# Patient Record
Sex: MALE | URBAN - NONMETROPOLITAN AREA
[De-identification: names, ages, dates, MRNs, and addresses within clinical notes are randomized per-mention and may not be internally consistent; named-entity substitution may affect disease eponyms.]

---

## 2021-01-27 ENCOUNTER — DOCTOR'S OFFICE (OUTPATIENT)
Dept: URBAN - NONMETROPOLITAN AREA CLINIC 1 | Facility: CLINIC | Age: 36
Setting detail: OPHTHALMOLOGY
End: 2021-01-27
Payer: COMMERCIAL

## 2021-01-27 DIAGNOSIS — H33.052: ICD-10-CM

## 2021-01-27 PROCEDURE — 76512 OPH US DX B-SCAN: CPT | Performed by: OPHTHALMOLOGY

## 2021-01-28 PROBLEM — H33.052 RETINAL DETACHMENT; LEFT EYE: Status: ACTIVE | Noted: 2021-01-27

## 2021-01-29 ASSESSMENT — VISUAL ACUITY
OS_BCVA: 20/40-2
OD_BCVA: LP

## 2023-11-14 ENCOUNTER — HOSPITAL ENCOUNTER (EMERGENCY)
Facility: HOSPITAL | Age: 38
Discharge: HOME | End: 2023-11-14
Attending: EMERGENCY MEDICINE | Admitting: EMERGENCY MEDICINE
Payer: COMMERCIAL

## 2023-11-14 VITALS
RESPIRATION RATE: 18 BRPM | BODY MASS INDEX: 44.14 KG/M2 | HEART RATE: 68 BPM | WEIGHT: 298 LBS | OXYGEN SATURATION: 97 % | SYSTOLIC BLOOD PRESSURE: 124 MMHG | DIASTOLIC BLOOD PRESSURE: 69 MMHG | HEIGHT: 69 IN | TEMPERATURE: 98.2 F

## 2023-11-14 DIAGNOSIS — R10.9 FLANK PAIN: Primary | ICD-10-CM

## 2023-11-14 LAB
ALBUMIN SERPL BCP-MCNC: 4.1 G/DL (ref 3.5–5)
ALP SERPL-CCNC: 67 U/L (ref 34–104)
ALT SERPL W P-5'-P-CCNC: 62 U/L (ref 7–52)
ANION GAP SERPL CALCULATED.3IONS-SCNC: 9 MMOL/L
APTT PPP: 29 SECONDS (ref 23–37)
AST SERPL W P-5'-P-CCNC: 85 U/L (ref 13–39)
ATRIAL RATE: 78 BPM
BACTERIA UR QL AUTO: ABNORMAL /HPF
BASOPHILS # BLD AUTO: 0.02 THOUSANDS/ÂΜL (ref 0–0.1)
BASOPHILS NFR BLD AUTO: 0 % (ref 0–1)
BILIRUB SERPL-MCNC: 0.41 MG/DL (ref 0.2–1)
BILIRUB UR QL STRIP: NEGATIVE
BUN SERPL-MCNC: 17 MG/DL (ref 5–25)
CALCIUM SERPL-MCNC: 9.3 MG/DL (ref 8.4–10.2)
CHLORIDE SERPL-SCNC: 108 MMOL/L (ref 96–108)
CLARITY UR: CLEAR
CO2 SERPL-SCNC: 26 MMOL/L (ref 21–32)
COLOR UR: YELLOW
CREAT SERPL-MCNC: 0.93 MG/DL (ref 0.6–1.3)
EOSINOPHIL # BLD AUTO: 0.21 THOUSAND/ÂΜL (ref 0–0.61)
EOSINOPHIL NFR BLD AUTO: 3 % (ref 0–6)
ERYTHROCYTE [DISTWIDTH] IN BLOOD BY AUTOMATED COUNT: 13 % (ref 11.6–15.1)
GFR SERPL CREATININE-BSD FRML MDRD: 104 ML/MIN/1.73SQ M
GLUCOSE SERPL-MCNC: 104 MG/DL (ref 65–140)
GLUCOSE UR STRIP-MCNC: NEGATIVE MG/DL
HCT VFR BLD AUTO: 41.2 % (ref 36.5–49.3)
HGB BLD-MCNC: 13.8 G/DL (ref 12–17)
HGB UR QL STRIP.AUTO: NEGATIVE
IMM GRANULOCYTES # BLD AUTO: 0.01 THOUSAND/UL (ref 0–0.2)
IMM GRANULOCYTES NFR BLD AUTO: 0 % (ref 0–2)
INR PPP: 0.99 (ref 0.84–1.19)
KETONES UR STRIP-MCNC: ABNORMAL MG/DL
LEUKOCYTE ESTERASE UR QL STRIP: NEGATIVE
LYMPHOCYTES # BLD AUTO: 2.36 THOUSANDS/ÂΜL (ref 0.6–4.47)
LYMPHOCYTES NFR BLD AUTO: 33 % (ref 14–44)
MCH RBC QN AUTO: 28 PG (ref 26.8–34.3)
MCHC RBC AUTO-ENTMCNC: 33.5 G/DL (ref 31.4–37.4)
MCV RBC AUTO: 84 FL (ref 82–98)
MONOCYTES # BLD AUTO: 0.61 THOUSAND/ÂΜL (ref 0.17–1.22)
MONOCYTES NFR BLD AUTO: 8 % (ref 4–12)
MUCOUS THREADS UR QL AUTO: ABNORMAL
NEUTROPHILS # BLD AUTO: 4.05 THOUSANDS/ÂΜL (ref 1.85–7.62)
NEUTS SEG NFR BLD AUTO: 56 % (ref 43–75)
NITRITE UR QL STRIP: NEGATIVE
NON-SQ EPI CELLS URNS QL MICRO: ABNORMAL /HPF
NRBC BLD AUTO-RTO: 0 /100 WBCS
P AXIS: 56 DEGREES
PH UR STRIP.AUTO: 6.5 [PH] (ref 4.5–8)
PLATELET # BLD AUTO: 280 THOUSANDS/UL (ref 149–390)
PMV BLD AUTO: 10.2 FL (ref 8.9–12.7)
POTASSIUM SERPL-SCNC: 3.8 MMOL/L (ref 3.5–5.3)
PR INTERVAL: 152 MS
PROT SERPL-MCNC: 6.6 G/DL (ref 6.4–8.4)
PROT UR STRIP-MCNC: ABNORMAL MG/DL
PROTHROMBIN TIME: 13 SECONDS (ref 11.6–14.5)
QRS AXIS: 88 DEGREES
QRSD INTERVAL: 68 MS
QT INTERVAL: 376 MS
QTC INTERVAL: 428 MS
RBC # BLD AUTO: 4.93 MILLION/UL (ref 3.88–5.62)
RBC #/AREA URNS AUTO: ABNORMAL /HPF
SODIUM SERPL-SCNC: 143 MMOL/L (ref 135–147)
SP GR UR STRIP.AUTO: >=1.03 (ref 1–1.03)
T WAVE AXIS: 15 DEGREES
UROBILINOGEN UR QL STRIP.AUTO: 2 E.U./DL
VENTRICULAR RATE: 78 BPM
WBC # BLD AUTO: 7.26 THOUSAND/UL (ref 4.31–10.16)
WBC #/AREA URNS AUTO: ABNORMAL /HPF

## 2023-11-14 PROCEDURE — 85730 THROMBOPLASTIN TIME PARTIAL: CPT

## 2023-11-14 PROCEDURE — 76775 US EXAM ABDO BACK WALL LIM: CPT | Performed by: EMERGENCY MEDICINE

## 2023-11-14 PROCEDURE — 85610 PROTHROMBIN TIME: CPT

## 2023-11-14 PROCEDURE — 96374 THER/PROPH/DIAG INJ IV PUSH: CPT

## 2023-11-14 PROCEDURE — 99284 EMERGENCY DEPT VISIT MOD MDM: CPT | Performed by: EMERGENCY MEDICINE

## 2023-11-14 PROCEDURE — 99284 EMERGENCY DEPT VISIT MOD MDM: CPT

## 2023-11-14 PROCEDURE — 80053 COMPREHEN METABOLIC PANEL: CPT

## 2023-11-14 PROCEDURE — 93005 ELECTROCARDIOGRAM TRACING: CPT

## 2023-11-14 PROCEDURE — 81001 URINALYSIS AUTO W/SCOPE: CPT

## 2023-11-14 PROCEDURE — 93010 ELECTROCARDIOGRAM REPORT: CPT | Performed by: INTERNAL MEDICINE

## 2023-11-14 PROCEDURE — 85025 COMPLETE CBC W/AUTO DIFF WBC: CPT

## 2023-11-14 PROCEDURE — 36415 COLL VENOUS BLD VENIPUNCTURE: CPT

## 2023-11-14 RX ORDER — KETOROLAC TROMETHAMINE 30 MG/ML
15 INJECTION, SOLUTION INTRAMUSCULAR; INTRAVENOUS ONCE
Status: COMPLETED | OUTPATIENT
Start: 2023-11-14 | End: 2023-11-14

## 2023-11-14 RX ADMIN — KETOROLAC TROMETHAMINE 15 MG: 30 INJECTION, SOLUTION INTRAMUSCULAR; INTRAVENOUS at 18:43

## 2023-11-14 NOTE — ED PROVIDER NOTES
Chief Complaint   Patient presents with    Flank Pain     Pt reports b/l flank pain x2 weeks worsening with movement and laying down and radiates into back; +nausea and difficulty with urination     History of Present Illness and Review of Systems   This is a 41 y.o. male with no known PMH coming in today with complaint of flank pain. Patient reports that he is having bilateral flank pain that is been ongoing for the last several days. Also reports feeling of "fullness" in his abdomen. He has never had symptoms like this previously. Reports he feels like he is "peeing less". Still maintains his ability to urinate. Denies any fevers or chills. No history of abdominal surgery. No history of liver disease. Denies any history of drug use or injection drug use. No concerns for traumatic injuries. Denies any history of ureteral stones. No fevers chills nausea vomiting. No known major medical problems. No other symptoms currently. - No language barrier. No other complaints for this encounter. Remainder of ROS Reviewed and Non-Pertinent    Past Medical, Past Surgical History:    has no past medical history on file. has no past surgical history on file. Allergies:   No Known Allergies    Social and Family History:     Social History     Substance and Sexual Activity   Alcohol Use Not on file     Social History     Tobacco Use   Smoking Status Not on file   Smokeless Tobacco Not on file     Social History     Substance and Sexual Activity   Drug Use Not on file       Physical Examination     Vitals:    11/14/23 1714 11/14/23 1717 11/14/23 1922   BP: (!) 163/105  124/69   BP Location:   Right arm   Pulse: 98  68   Resp: 18  18   Temp: 98.2 °F (36.8 °C)     TempSrc: Tympanic     SpO2: 96%  97%   Weight:  135 kg (298 lb)    Height:  5' 9" (1.753 m)        Physical Exam  Vitals and nursing note reviewed. Constitutional:       General: He is not in acute distress.      Appearance: He is well-developed. HENT:      Head: Normocephalic and atraumatic. Eyes:      Extraocular Movements: Extraocular movements intact. Conjunctiva/sclera: Conjunctivae normal.      Pupils: Pupils are equal, round, and reactive to light. Cardiovascular:      Rate and Rhythm: Normal rate and regular rhythm. Heart sounds: No murmur heard. Pulmonary:      Effort: Pulmonary effort is normal. No respiratory distress. Breath sounds: Normal breath sounds. Abdominal:      Palpations: Abdomen is soft. Tenderness: There is no abdominal tenderness. There is no guarding or rebound. Musculoskeletal:         General: No swelling. Cervical back: Neck supple. Right lower leg: No edema. Left lower leg: No edema. Skin:     General: Skin is warm and dry. Capillary Refill: Capillary refill takes less than 2 seconds. Neurological:      General: No focal deficit present. Mental Status: He is alert. Cranial Nerves: No cranial nerve deficit. Motor: No weakness. Psychiatric:         Mood and Affect: Mood normal.           Procedures   Procedures      MDM:   Medical Decision Making  Mike Dawkins is a 41 y.o. who presents with complaints of flank pain    Vital signs are stable, physical exam shows no abdominal tenderness, does have moderate obesity however no evidence of ascites, benign cardiorespiratory exam, 2+ pulses in all 4 extremities    Ddx: Overall will evaluate for ureteral stone versus renal failure versus liver disease versus ascites. The patient also may be having constipation as well. His benign abdominal examination is reassuring against appendicitis or colitis. However will obtain further work-up and evaluate his renal and liver function. POCUS negative for hydronephrosis or ascites    Plan: Workup will include CBC, CMP, coags. Will monitor closely and reassess. Reassessment/Disposition: Work-up negative for any acute or chronic abnormality.   Labs are.  Within normal limits. No evidence of liver disease or otherwise. Recommended outpatient follow-up. Advised on return precautions as well. His pain may be related constipation. Recommended MiraLAX as well. Amount and/or Complexity of Data Reviewed  Labs: ordered. Decision-making details documented in ED Course. Risk  Prescription drug management. - Reviewed relevant past office visits/hospitalizations/procedures  -Obtained pertinent history that influenced decision making from the pt    ED Course as of 11/14/23 2031 Tue Nov 14, 2023   1837 POCUS negative for ascites   1837 Awaiting labs and will DC   1916 PTT: 29   1916 PROTIME: 13.0   1935 Workup negative. Mild elevated liver enzymes noted. Will recommend outpatient follow up      Final Dispo   Final Diagnosis:  1. Flank pain      Time reflects when diagnosis was documented in both MDM as applicable and the Disposition within this note       Time User Action Codes Description Comment    11/14/2023  7:19 PM Edwina Dec Add [R10.9] Flank pain           ED Disposition       ED Disposition   Discharge    Condition   Stable    Date/Time   Tue Nov 14, 2023  7:19 PM    Comment   Saadia Brand discharge to home/self care.                    Follow-up Information    None       Medications   ketorolac (TORADOL) injection 15 mg (15 mg Intravenous Given 11/14/23 1843)       Risk Stratification Tools                Orders Placed This Encounter   Procedures    POC Renal US    CBC and differential    Comprehensive metabolic panel    Protime-INR    APTT    Urine Microscopic    Insert peripheral IV    Urine dip analyzer    ECG 12 lead       Labs:     Labs Reviewed   COMPREHENSIVE METABOLIC PANEL - Abnormal       Result Value Ref Range Status    Sodium 143  135 - 147 mmol/L Final    Potassium 3.8  3.5 - 5.3 mmol/L Final    Chloride 108  96 - 108 mmol/L Final    CO2 26  21 - 32 mmol/L Final    ANION GAP 9  mmol/L Final    BUN 17  5 - 25 mg/dL Final Creatinine 0.93  0.60 - 1.30 mg/dL Final    Comment: Standardized to IDMS reference method    Glucose 104  65 - 140 mg/dL Final    Comment: If the patient is fasting, the ADA then defines impaired fasting glucose as > 100 mg/dL and diabetes as > or equal to 123 mg/dL. Calcium 9.3  8.4 - 10.2 mg/dL Final    AST 85 (*) 13 - 39 U/L Final    ALT 62 (*) 7 - 52 U/L Final    Comment: Specimen collection should occur prior to Sulfasalazine administration due to the potential for falsely depressed results. Alkaline Phosphatase 67  34 - 104 U/L Final    Total Protein 6.6  6.4 - 8.4 g/dL Final    Albumin 4.1  3.5 - 5.0 g/dL Final    Total Bilirubin 0.41  0.20 - 1.00 mg/dL Final    Comment: Use of this assay is not recommended for patients undergoing treatment with eltrombopag due to the potential for falsely elevated results. N-acetyl-p-benzoquinone imine (metabolite of Acetaminophen) will generate erroneously low results in samples for patients that have taken an overdose of Acetaminophen.     eGFR 104  ml/min/1.73sq m Final    Narrative:     [place] guidelines for Chronic Kidney Disease (CKD):     Stage 1 with normal or high GFR (GFR > 90 mL/min/1.73 square meters)    Stage 2 Mild CKD (GFR = 60-89 mL/min/1.73 square meters)    Stage 3A Moderate CKD (GFR = 45-59 mL/min/1.73 square meters)    Stage 3B Moderate CKD (GFR = 30-44 mL/min/1.73 square meters)    Stage 4 Severe CKD (GFR = 15-29 mL/min/1.73 square meters)    Stage 5 End Stage CKD (GFR <15 mL/min/1.73 square meters)  Note: GFR calculation is accurate only with a steady state creatinine   URINE MACROSCOPIC, POC - Abnormal    Color, UA Yellow   Final    Clarity, UA Clear   Final    pH, UA 6.5  4.5 - 8.0 Final    Leukocytes, UA Negative  Negative Final    Nitrite, UA Negative  Negative Final    Protein, UA 30 (1+) (*) Negative mg/dl Final    Glucose, UA Negative  Negative mg/dl Final    Ketones, UA Trace (*) Negative mg/dl Final Urobilinogen, UA 2.0 (*) 0.2, 1.0 E.U./dl E.U./dl Final    Bilirubin, UA Negative  Negative Final    Occult Blood, UA Negative  Negative Final    Specific Gravity, UA >=1.030  1.003 - 1.030 Final    Narrative:     CLINITEK RESULT   PROTIME-INR - Normal    Protime 13.0  11.6 - 14.5 seconds Final    INR 0.99  0.84 - 1.19 Final   APTT - Normal    PTT 29  23 - 37 seconds Final    Comment: Therapeutic Heparin Range =  60-90 seconds   CBC AND DIFFERENTIAL    WBC 7.26  4.31 - 10.16 Thousand/uL Final    RBC 4.93  3.88 - 5.62 Million/uL Final    Hemoglobin 13.8  12.0 - 17.0 g/dL Final    Hematocrit 41.2  36.5 - 49.3 % Final    MCV 84  82 - 98 fL Final    MCH 28.0  26.8 - 34.3 pg Final    MCHC 33.5  31.4 - 37.4 g/dL Final    RDW 13.0  11.6 - 15.1 % Final    MPV 10.2  8.9 - 12.7 fL Final    Platelets 254  521 - 390 Thousands/uL Final    nRBC 0  /100 WBCs Final    Neutrophils Relative 56  43 - 75 % Final    Immat GRANS % 0  0 - 2 % Final    Lymphocytes Relative 33  14 - 44 % Final    Monocytes Relative 8  4 - 12 % Final    Eosinophils Relative 3  0 - 6 % Final    Basophils Relative 0  0 - 1 % Final    Neutrophils Absolute 4.05  1.85 - 7.62 Thousands/µL Final    Immature Grans Absolute 0.01  0.00 - 0.20 Thousand/uL Final    Lymphocytes Absolute 2.36  0.60 - 4.47 Thousands/µL Final    Monocytes Absolute 0.61  0.17 - 1.22 Thousand/µL Final    Eosinophils Absolute 0.21  0.00 - 0.61 Thousand/µL Final    Basophils Absolute 0.02  0.00 - 0.10 Thousands/µL Final   URINE MICROSCOPIC       Imaging:     No orders to display      All details of the evaluation and treatment plan were made clear and additionally all questions and concerns were addressed while under my care. Portions of the record may have been created with voice recognition software. Occasional wrong word or "sound a like" substitutions may have occurred due to the inherent limitations of voice recognition software.  Read the chart carefully and recognize, using context, where substitutions have occurred. The attending physician physically available and evaluated the above patient alongside myself.       Debo Miles MD  11/14/23 2031

## 2023-11-14 NOTE — ED PROCEDURE NOTE
Procedure  POC Renal US    Date/Time: 11/14/2023 6:32 PM    Performed by: Saumya Cross MD  Authorized by: Saumya Cross MD    Patient location:  ED  Performed by:  Resident  Other Assisting Provider: No    Procedure details:     Exam Type:  Diagnostic    Indications: flank/back pain      Assessment for:  Bladder volume and suspected hydronephrosis    Views obtained: bladder (transverse and sagittal), left kidney and right kidney      Image quality: diagnostic      Image availability:  Images available in PACS  Findings:     LEFT kidney findings: unremarkable      LEFT hydronephrosis: indeterminate      RIGHT kidney findings: unremarkable      RIGHT hydronephrosis: indeterminate      Bladder:  Visualized  Interpretation:     Renal ultrasound impressions: normal exam                     Saumya Cross MD  11/14/23 [id number]

## 2023-11-14 NOTE — Clinical Note
Margy Huang was seen and treated in our emergency department on 11/14/2023. Diagnosis:      Brooks  . He may return on this date: 11/20/2023         If you have any questions or concerns, please don't hesitate to call.       Abbi King MD    ______________________________           _______________          _______________  Hospital Representative                              Date                                Time

## 2023-11-14 NOTE — ED ATTENDING ATTESTATION
11/14/2023  Gloria Castro DO, saw and evaluated the patient. I have discussed the patient with the resident/non-physician practitioner and agree with the resident's/non-physician practitioner's findings, Plan of Care, and MDM as documented in the resident's/non-physician practitioner's note, except where noted. All available labs and Radiology studies were reviewed. I was present for key portions of any procedure(s) performed by the resident/non-physician practitioner and I was immediately available to provide assistance. At this point I agree with the current assessment done in the Emergency Department. I have conducted an independent evaluation of this patient a history and physical is as follows:      40 yo male presents for evaluation of abd discomfort and swelling over the L side of abd when he is working and turned to the right. He thinks he is not urinating as much as usual as well. No other c/o at this time. POCUS abd no ascites. No hydronephrosis B. Normal bladder with small amount of urine present. On exam, swelling is only apparent when he is engaging abd muscles to sit up and is present bilaterally. Not c/w hernia. Seems likely to just be colon being able to be palpated around abd musculature. Imp: abd distention likely constipation plan: labs to eval liver, renal function, reassess.         ED Course         Critical Care Time  Procedures

## 2023-11-15 NOTE — DISCHARGE INSTRUCTIONS
Your workup here was not concerning for anything dangerous. Therefore there is no need for you to stay at the hospital for further testing. We feel safe to send you home. You can use Miralax for management of your symptoms. You should follow up with your PCP to assess for resolution of your symptoms and to determine if there is any further evaluation that needs to be performed. Return to the emergency department if you have any symptoms of worsening pain or fevers    Thank you for choosing [address] [place] [address] for your care!

## 2025-01-07 ENCOUNTER — PATIENT OUTREACH (OUTPATIENT)
Dept: OTHER | Facility: OTHER | Age: 40
End: 2025-01-07

## 2025-01-09 ENCOUNTER — PATIENT OUTREACH (OUTPATIENT)
Facility: HOSPITAL | Age: 40
End: 2025-01-09

## 2025-01-09 VITALS
RESPIRATION RATE: 18 BRPM | SYSTOLIC BLOOD PRESSURE: 148 MMHG | DIASTOLIC BLOOD PRESSURE: 82 MMHG | OXYGEN SATURATION: 98 % | HEART RATE: 86 BPM
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
## 2025-01-09 NOTE — PROGRESS NOTES
Spoke with client on medical van at [facility] on [facility]. Client needs new PCP. Client saw Dr. Candelario. Will meet resource nurse Reji ARGUELLO at [facility] on 1/9/25.

## 2025-01-10 ENCOUNTER — OFFICE VISIT (OUTPATIENT)
Dept: FAMILY MEDICINE CLINIC | Facility: CLINIC | Age: 40
End: 2025-01-10

## 2025-01-10 VITALS
OXYGEN SATURATION: 98 % | HEIGHT: 69 IN | BODY MASS INDEX: 33.47 KG/M2 | SYSTOLIC BLOOD PRESSURE: 150 MMHG | RESPIRATION RATE: 18 BRPM | WEIGHT: 226 LBS | DIASTOLIC BLOOD PRESSURE: 90 MMHG | HEART RATE: 83 BPM | TEMPERATURE: 98.5 F

## 2025-01-10 DIAGNOSIS — Z13.1 SCREENING FOR DIABETES MELLITUS: ICD-10-CM

## 2025-01-10 DIAGNOSIS — H57.12 EYE PAIN, LEFT: ICD-10-CM

## 2025-01-10 DIAGNOSIS — E66.811 CLASS 1 OBESITY DUE TO EXCESS CALORIES WITHOUT SERIOUS COMORBIDITY WITH BODY MASS INDEX (BMI) OF 33.0 TO 33.9 IN ADULT: ICD-10-CM

## 2025-01-10 DIAGNOSIS — Z59.9 FINANCIAL DIFFICULTIES: ICD-10-CM

## 2025-01-10 DIAGNOSIS — Z00.00 ANNUAL PHYSICAL EXAM: Primary | ICD-10-CM

## 2025-01-10 DIAGNOSIS — E66.09 CLASS 1 OBESITY DUE TO EXCESS CALORIES WITHOUT SERIOUS COMORBIDITY WITH BODY MASS INDEX (BMI) OF 33.0 TO 33.9 IN ADULT: ICD-10-CM

## 2025-01-10 DIAGNOSIS — Z11.3 SCREENING EXAMINATION FOR STI: ICD-10-CM

## 2025-01-10 DIAGNOSIS — I10 PRIMARY HYPERTENSION: ICD-10-CM

## 2025-01-10 PROBLEM — E11.9 TYPE 2 DIABETES MELLITUS WITHOUT COMPLICATION, WITHOUT LONG-TERM CURRENT USE OF INSULIN (HCC): Status: ACTIVE | Noted: 2025-01-10

## 2025-01-10 LAB — SL AMB POCT HEMOGLOBIN AIC: 5.2 (ref ?–6.5)

## 2025-01-10 PROCEDURE — 99385 PREV VISIT NEW AGE 18-39: CPT

## 2025-01-10 PROCEDURE — 99204 OFFICE O/P NEW MOD 45 MIN: CPT

## 2025-01-10 PROCEDURE — 83036 HEMOGLOBIN GLYCOSYLATED A1C: CPT

## 2025-01-10 RX ORDER — LISINOPRIL 10 MG/1
10 TABLET ORAL DAILY
Qty: 30 TABLET | Refills: 2 | Status: SHIPPED | OUTPATIENT
Start: 2025-01-10

## 2025-01-10 SDOH — ECONOMIC STABILITY - INCOME SECURITY: PROBLEM RELATED TO HOUSING AND ECONOMIC CIRCUMSTANCES, UNSPECIFIED: Z59.9

## 2025-01-10 NOTE — ASSESSMENT & PLAN NOTE
"Patient with previous diagnosis and managed on metformin. Has been without medication for > 2 years. Will restart.   Follow up in 3 months.     No results found for: \"HGBA1C\"    Orders:    POCT hemoglobin A1c    "

## 2025-01-10 NOTE — PATIENT INSTRUCTIONS
"Patient Education     Routine physical for adults   The Basics   Written by the doctors and editors at [facility]   What is a physical? -- A physical is a routine visit, or \"check-up,\" with your doctor. You might also hear it called a \"wellness visit\" or \"preventive visit.\"  During each visit, the doctor will:   Ask about your physical and mental health   Ask about your habits, behaviors, and lifestyle   Do an exam   Give you vaccines if needed   Talk to you about any medicines you take   Give advice about your health   Answer your questions  Getting regular check-ups is an important part of taking care of your health. It can help your doctor find and treat any problems you have. But it's also important for preventing health problems.  A routine physical is different from a \"sick visit.\" A sick visit is when you see a doctor because of a health concern or problem. Since physicals are scheduled ahead of time, you can think about what you want to ask the doctor.  How often should I get a physical? -- It depends on your age and health. In general, for people age 21 years and older:   If you are younger than 50 years, you might be able to get a physical every 3 years.   If you are 50 years or older, your doctor might recommend a physical every year.  If you have an ongoing health condition, like diabetes or high blood pressure, your doctor will probably want to see you more often.  What happens during a physical? -- In general, each visit will include:   Physical exam - The doctor or nurse will check your height, weight, heart rate, and blood pressure. They will also look at your eyes and ears. They will ask about how you are feeling and whether you have any symptoms that bother you.   Medicines - It's a good idea to bring a list of all the medicines you take to each doctor visit. Your doctor will talk to you about your medicines and answer any questions. Tell them if you are having any side effects that bother you. You " "should also tell them if you are having trouble paying for any of your medicines.   Habits and behaviors - This includes:   Your diet   Your exercise habits   Whether you smoke, drink alcohol, or use drugs   Whether you are sexually active   Whether you feel safe at home  Your doctor will talk to you about things you can do to improve your health and lower your risk of health problems. They will also offer help and support. For example, if you want to quit smoking, they can give you advice and might prescribe medicines. If you want to improve your diet or get more physical activity, they can help you with this, too.   Lab tests, if needed - The tests you get will depend on your age and situation. For example, your doctor might want to check your:   Cholesterol   Blood sugar   Iron level   Vaccines - The recommended vaccines will depend on your age, health, and what vaccines you already had. Vaccines are very important because they can prevent certain serious or deadly infections.   Discussion of screening - \"Screening\" means checking for diseases or other health problems before they cause symptoms. Your doctor can recommend screening based on your age, risk, and preferences. This might include tests to check for:   Cancer, such as breast, prostate, cervical, ovarian, colorectal, prostate, lung, or skin cancer   Sexually transmitted infections, such as chlamydia and gonorrhea   Mental health conditions like depression and anxiety  Your doctor will talk to you about the different types of screening tests. They can help you decide which screenings to have. They can also explain what the results might mean.   Answering questions - The physical is a good time to ask the doctor or nurse questions about your health. If needed, they can refer you to other doctors or specialists, too.  Adults older than 65 years often need other care, too. As you get older, your doctor will talk to you about:   How to prevent falling at " home   Hearing or vision tests   Memory testing   How to take your medicines safely   Making sure that you have the help and support you need at home  All topics are updated as new evidence becomes available and our peer review process is complete.  This topic retrieved from [employer] on: May 02, 2024.  Topic 415625 Version 1.0  Release: 32.4.3 - C32.122  © 2024 UpToDate, Inc. and/or its affiliates. All rights reserved.  Consumer Information Use and Disclaimer   Disclaimer: This generalized information is a limited summary of diagnosis, treatment, and/or medication information. It is not meant to be comprehensive and should be used as a tool to help the user understand and/or assess potential diagnostic and treatment options. It does NOT include all information about conditions, treatments, medications, side effects, or risks that may apply to a specific patient. It is not intended to be medical advice or a substitute for the medical advice, diagnosis, or treatment of a health care provider based on the health care provider's examination and assessment of a patient's specific and unique circumstances. Patients must speak with a health care provider for complete information about their health, medical questions, and treatment options, including any risks or benefits regarding use of medications. This information does not endorse any treatments or medications as safe, effective, or approved for treating a specific patient. UpToDate, Inc. and its affiliates disclaim any warranty or liability relating to this information or the use thereof.The use of this information is governed by the Terms of Use, available at https://www.[employer].com/en/know/clinical-effectiveness-terms. 2024© UpToDate, Inc. and its affiliates and/or licensors. All rights reserved.  Copyright   © 2024 UpToDate, Inc. and/or its affiliates. All rights reserved.

## 2025-01-10 NOTE — PROGRESS NOTES
Adult Annual Physical  Name: Benja Albarado      : 1985      MRN: [id number]  Encounter Provider: OSCAR Hernandez  Encounter Date: 1/10/2025   Encounter department: [facility]    Assessment & Plan  Annual physical exam  Immunizations and preventive care screenings were discussed with patient today. Appropriate education was printed on patient's after visit summary. Patient declined recommended immunizations.        Primary hypertension  Previously diagnosed and mangaged on lisinopril 10 mg. Patient has been without meds for > 2years.   Will start restart lisinopril 10 mg.   Return for nurse visit BP check in 2 weeks.   Advised DASH diet and weight loss.   BP Readings from Last 3 Encounters:   01/10/25 150/90   25 148/82   23 124/69       Orders:    lisinopril (ZESTRIL) 10 mg tablet; Take 1 tablet (10 mg total) by mouth daily    Financial difficulties    Orders:    Ambulatory referral to social work care management program; Future    Screening examination for STI  Denies symptoms, but requests testing.   Orders:    Trichomonas Vaginalis, PHYLLIS; Future    Chlamydia/GC amplified DNA by PCR; Future    RPR-Syphilis Screening (Total Syphilis IGG/IGM); Future    HIV 1/2 AG/AB w Reflex [facility] for 2 yr old and above; Future    Hepatitis panel, acute; Future    Class 1 obesity due to excess calories without serious comorbidity with body mass index (BMI) of 33.0 to 33.9 in adult  Recommended exercise for 30-45 minutes 5 days a week. Focus on strength training as well as activities that increase heart rate like walking, dancing, jogging, running, etc.   Recommended a balanced diet with 4-5 servings of fruits and vegetables every day, lean meats, and whole grains, Try to avoid fried foods and sugary drinks like sweetened sodas, teas, or juice.     Orders:    Comprehensive metabolic panel; Future    CBC and differential; Future    Vitamin D 25 hydroxy; Future    TSH, 3rd  generation with Free T4 reflex; Future    Lipid panel; Future    Eye pain, left  Patient previously being followed by ophthalmology after a left eye injury. Has been lost to follow up and having increasing pain and light sensitivity. No changes in vision.   Orders:    Ambulatory Referral to Ophthalmology; Future    Screening for diabetes mellitus  Patient reports a history of diabetes on metformin. He has been without medication for over 2 years. Recheck of A1C shows normal value at 5.5. No indication to restart metformin at this time.     Orders:    POCT hemoglobin A1c      Counseling:  Alcohol/drug use: discussed moderation in alcohol intake, the recommendations for healthy alcohol use, and avoidance of illicit drug use.  Dental Health: discussed importance of regular tooth brushing, flossing, and dental visits.  Injury prevention: discussed safety/seat belts, safety helmets, smoke detectors, carbon monoxide detectors, and smoking near bedding or upholstery.  Sexual health: discussed sexually transmitted diseases, partner selection, use of condoms, avoidance of unintended pregnancy, and contraceptive alternatives.  Exercise: the importance of regular exercise/physical activity was discussed. Recommend exercise 3-5 times per week for at least 30 minutes.     BMI Counseling: Body mass index is 33.37 kg/m². The BMI is above normal. Nutrition recommendations include decreasing portion sizes, encouraging healthy choices of fruits and vegetables, decreasing fast food intake, consuming healthier snacks, limiting drinks that contain sugar, moderation in carbohydrate intake and reducing intake of cholesterol. Exercise recommendations include moderate physical activity 150 minutes/week, exercising 3-5 times per week and strength training exercises. No pharmacotherapy was ordered. Patient referred to PCP. Rationale for BMI follow-up plan is due to patient being overweight or obese.     Depression Screening and Follow-up  Plan: Patient was screened for depression during today's encounter. They screened negative with a PHQ-2 score of 0.        History of Present Illness     Adult Annual Physical:  Patient presents for annual physical. Benja Albarado is a 39 year old male with no documented past medical history. He presents to the office today for a routine annual physical and to establish care.   At today's visit, he reports a history of hypertension, T2DM for which he was previously on medication. He also reports a left eye injury 2 years ago for which he was prescribed ophthalmalgic drops. He states he has been without these drops for several months and is having an increase in eye pain. .     Diet and Physical Activity:  - Diet/Nutrition: portion control.  - Exercise: walking, strength training exercises and 5-7 times a week on average.    Depression Screening:  - PHQ-2 Score: 0    General Health:  - Sleep: sleeps well and 4-6 hours of sleep on average.  - Hearing: normal hearing right ear.  - Vision: vision problems.  - Dental: no dental visits for > 1 year and brushes teeth once daily.     Health:  - History of STDs: no.   - Urinary symptoms: none.     Review of Systems   Constitutional:  Negative for chills and fever.   HENT:  Negative for ear pain and sore throat.    Eyes:  Positive for photophobia and pain. Negative for visual disturbance.   Respiratory:  Negative for cough and shortness of breath.    Cardiovascular:  Negative for chest pain and palpitations.   Gastrointestinal:  Negative for abdominal pain and vomiting.   Genitourinary:  Negative for dysuria and hematuria.   Musculoskeletal:  Negative for arthralgias and back pain.   Skin:  Negative for color change and rash.   Neurological:  Negative for seizures and syncope.   All other systems reviewed and are negative.    Pertinent Medical History   Patient Active Problem List   Diagnosis    Primary hypertension    Type 2 diabetes mellitus without complication, without  "long-term current use of insulin (HCC)         Medical History Reviewed by provider this encounter:     .  No current outpatient medications on file prior to visit.     No current facility-administered medications on file prior to visit.      Social History     Tobacco Use    Smoking status: Never     Passive exposure: Never    Smokeless tobacco: Never   Vaping Use    Vaping status: Never Used   Substance and Sexual Activity    Alcohol use: Not Currently    Drug use: Never    Sexual activity: Not on file       Objective   /90 (BP Location: Right arm, Patient Position: Sitting, Cuff Size: Large)   Pulse 83   Temp 98.5 °F (36.9 °C) (Temporal)   Resp 18   Ht 5' 9\" (1.753 m)   Wt 103 kg (226 lb)   SpO2 98%   BMI 33.37 kg/m²     Physical Exam  Vitals and nursing note reviewed.   Constitutional:       General: He is not in acute distress.     Appearance: He is well-developed.   HENT:      Head: Normocephalic and atraumatic.      Right Ear: Tympanic membrane, ear canal and external ear normal.      Left Ear: Tympanic membrane, ear canal and external ear normal.      Nose: Nose normal. No congestion.      Mouth/Throat:      Mouth: Mucous membranes are moist.      Pharynx: Oropharynx is clear. No oropharyngeal exudate or posterior oropharyngeal erythema.   Eyes:      Conjunctiva/sclera: Conjunctivae normal.   Cardiovascular:      Rate and Rhythm: Normal rate and regular rhythm.      Heart sounds: No murmur heard.  Pulmonary:      Effort: Pulmonary effort is normal. No respiratory distress.      Breath sounds: Normal breath sounds.   Abdominal:      General: Bowel sounds are normal.      Palpations: Abdomen is soft.      Tenderness: There is no abdominal tenderness.   Musculoskeletal:         General: No swelling.      Cervical back: Neck supple.   Lymphadenopathy:      Cervical: No cervical adenopathy.   Skin:     General: Skin is warm and dry.      Capillary Refill: Capillary refill takes less than 2 seconds. "      Findings: No rash.   Neurological:      Mental Status: He is alert.   Psychiatric:         Mood and Affect: Mood normal.

## 2025-01-10 NOTE — ASSESSMENT & PLAN NOTE
Previously diagnosed and mangaged on lisinopril 10 mg. Patient has been without meds for > 2years.   Will start restart lisinopril 10 mg.   Return for nurse visit BP check in 2 weeks.   Advised DASH diet and weight loss.   BP Readings from Last 3 Encounters:   01/10/25 150/90   01/09/25 148/82   11/14/23 124/69       Orders:    lisinopril (ZESTRIL) 10 mg tablet; Take 1 tablet (10 mg total) by mouth daily

## 2025-01-14 ENCOUNTER — PATIENT OUTREACH (OUTPATIENT)
Dept: OTHER | Facility: OTHER | Age: 40
End: 2025-01-14

## 2025-01-14 NOTE — PROGRESS NOTES
Ce from [facility] called from [facility] with the client. Client does not have a phone so he used Ce's phone to ask about opthalmology appointment. This writer contacted Amanda Campos and scheduled an appointment for 1/16 @ 10:45 am at [address] [facility] [address] [place] [address] [facility]. This message was relayed to client. Client will take a bus to appointment. No further outreach scheduled.

## 2025-01-15 ENCOUNTER — PATIENT OUTREACH (OUTPATIENT)
Dept: FAMILY MEDICINE CLINIC | Facility: CLINIC | Age: 40
End: 2025-01-15

## 2025-01-15 NOTE — PROGRESS NOTES
OP SW had received a referral from OSCAR Hernandez r/t financial difficulties. OP SW had completed a chart review. Per chart, reason for referral: patient with at risk responses for financial resource strain, transportation needs, utilities, housing stability, and/or food insecurity (SDOH). OP SW will outreach patient to further assess needs.    OP SW had called the patient via phone. OP SW notes females picked up call. OP SW was told this number did not belong to patient. OP SW notes patient has no other number on file so unable to f/u with referral at this time. OP SW closed referral. Please reconsult SW for future needs.

## 2025-01-23 ENCOUNTER — PATIENT OUTREACH (OUTPATIENT)
Facility: HOSPITAL | Age: 40
End: 2025-01-23

## 2025-01-23 ENCOUNTER — OFFICE VISIT (OUTPATIENT)
Age: 40
End: 2025-01-23
Payer: COMMERCIAL

## 2025-01-23 VITALS
DIASTOLIC BLOOD PRESSURE: 130 MMHG | HEART RATE: 84 BPM | TEMPERATURE: 99.2 F | SYSTOLIC BLOOD PRESSURE: 190 MMHG | OXYGEN SATURATION: 97 %

## 2025-01-23 DIAGNOSIS — S05.22XS: ICD-10-CM

## 2025-01-23 DIAGNOSIS — Z76.89: ICD-10-CM

## 2025-01-23 DIAGNOSIS — F99 MENTAL HEALTH DISORDER: ICD-10-CM

## 2025-01-23 DIAGNOSIS — H43.821 VITREOMACULAR ADHESION OF RIGHT EYE: Primary | ICD-10-CM

## 2025-01-23 DIAGNOSIS — H57.12 EYE PAIN, LEFT: ICD-10-CM

## 2025-01-23 DIAGNOSIS — R21 RASH: Primary | ICD-10-CM

## 2025-01-23 PROCEDURE — 92134 CPTRZ OPH DX IMG PST SGM RTA: CPT | Performed by: OPHTHALMOLOGY

## 2025-01-23 PROCEDURE — 92004 COMPRE OPH EXAM NEW PT 1/>: CPT | Performed by: OPHTHALMOLOGY

## 2025-01-23 NOTE — PROGRESS NOTES
Name: Benja Albarado      : 1985      MRN: [id number]  Encounter Provider: Matias Marquez  Encounter Date: 2025   Encounter department: [facility]  :  Assessment & Plan  Rash  Patient has been sleeping at the [facility] and complains of new onset itchy rash on the right cheek after laying down. Patient [facility] He has not had a rash like this before, and has not spread anywhere.     PLAN:  - Swap to non scented detergent  - If rash begins to worsen can follow up and consider topical corticosteroids.  - advise to apply Vaseline           Psychiatric care  Patient was previously on depakote - last taken 6 years ago. Patient would like to get connected to psychiatric care.     PLAN:  - Refer for outpatient psychiatric services.             History of Present Illness   Benja Albarado is a 39 y.o male with PMHx of primary hypertention, glaucoma, and cataracts who presents with one day history of rash on his right cheek that he noticed after laying down at the [facility] on some dirt. He describes rash as itchy when touched but denies any subjective pain.        Objective   BP (!) 190/130 (BP Location: Right arm, Patient Position: Sitting)   Pulse 84   Temp 99.2 °F (37.3 °C)   SpO2 97%      Physical Exam  Constitutional:       General: He is not in acute distress.  HENT:      Nose: Nose normal. No congestion.   Cardiovascular:      Rate and Rhythm: Normal rate and regular rhythm.      Heart sounds: Normal heart sounds. No murmur heard.     No gallop.   Pulmonary:      Effort: Pulmonary effort is normal. No respiratory distress.      Breath sounds: Normal breath sounds. No stridor.   Skin:     General: Skin is warm.      Findings: Rash (Right cheek) present.   Neurological:      Mental Status: He is alert.
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
## 2025-01-24 ENCOUNTER — TELEPHONE (OUTPATIENT)
Age: 40
End: 2025-01-24

## 2025-01-24 NOTE — ASSESSMENT & PLAN NOTE
Patient has been sleeping at the [facility] and complains of new onset itchy rash on the right cheek after laying down. Patient noted that he is using a new detergent [facility] the [facility]. He has not had a rash like this before, and has not spread anywhere.     PLAN:  - Swap to non scented detergent  - If rash begins to worsen can follow up and consider topical corticosteroids.  - advise to apply Vaseline

## 2025-01-24 NOTE — TELEPHONE ENCOUNTER
Routine referral received 1/24/25 for Med Management services. Outreach call placed to inquire about pt’s interest in services and adding to the appropriate wait list. LVM to call back, [phone].    Insurance verified via promise:  [facility] ([facility])  ID: [id number]      1st attempt

## 2025-01-24 NOTE — ASSESSMENT & PLAN NOTE
Patient was previously on depakote - last taken 6 years ago. Patient would like to get connected to psychiatric care.     PLAN:  - Refer for outpatient psychiatric services.

## 2025-01-31 NOTE — TELEPHONE ENCOUNTER
Contacted patient in regards to Routine Referral in attempts to verify patient's needs of services and add patient to proper wait list. Writer left vm to call intake at [phone]    Please add patient to proper WL(s)    Attempt #2

## 2025-02-17 ENCOUNTER — HOSPITAL ENCOUNTER (EMERGENCY)
Facility: HOSPITAL | Age: 40
Discharge: HOME | End: 2025-02-17
Attending: EMERGENCY MEDICINE | Admitting: EMERGENCY MEDICINE
Payer: COMMERCIAL

## 2025-02-17 ENCOUNTER — TELEPHONE (OUTPATIENT)
Dept: FAMILY MEDICINE CLINIC | Facility: CLINIC | Age: 40
End: 2025-02-17

## 2025-02-17 VITALS
SYSTOLIC BLOOD PRESSURE: 179 MMHG | TEMPERATURE: 98.4 F | RESPIRATION RATE: 18 BRPM | DIASTOLIC BLOOD PRESSURE: 100 MMHG | BODY MASS INDEX: 32.04 KG/M2 | OXYGEN SATURATION: 96 % | HEART RATE: 96 BPM | WEIGHT: 216.93 LBS

## 2025-02-17 DIAGNOSIS — T50.901A ACCIDENTAL OVERDOSE, INITIAL ENCOUNTER: Primary | ICD-10-CM

## 2025-02-17 PROCEDURE — 99284 EMERGENCY DEPT VISIT MOD MDM: CPT

## 2025-02-17 PROCEDURE — 99284 EMERGENCY DEPT VISIT MOD MDM: CPT | Performed by: EMERGENCY MEDICINE

## 2025-02-17 NOTE — ED PROVIDER NOTES
"Time reflects when diagnosis was documented in both MDM as applicable and the Disposition within this note       Time User Action Codes Description Comment    2/17/2025  7:25 PM Jasvir Mosquera Add [T50.901A] Accidental overdose, initial encounter           ED Disposition       ED Disposition   Discharge    Condition   Stable    Date/Time   Mon Feb 17, 2025  7:25 PM    Comment   Benja Albarado discharge to home/self care.                   Assessment & Plan       Medical Decision Making  Problems Addressed:  Accidental overdose, initial encounter: acute illness or injury     Details: With bruxism presentation, possible MDMA over LSD.  But after period of observation patient without any issues or complaints.     Amount and/or Complexity of Data Reviewed  Independent Historian: EMS        ED Course as of 02/18/25 2307   Mon Feb 17, 2025   1925 Feeling improved.  Will dc       Medications - No data to display    ED Risk Strat Scores                                                History of Present Illness       Chief Complaint   Patient presents with    Overdose - Accidental     States he took acid about 2-3 hrs pta. States he had not used any in \"a while\" and now his tongue feels swollen. Denies any hallucinations at this time       Past Medical History:   Diagnosis Date    Diabetes mellitus (HCC)     Glaucoma       History reviewed. No pertinent surgical history.   Family History   Problem Relation Age of Onset    Glaucoma Mother     Macular degeneration Maternal Grandmother       Social History     Tobacco Use    Smoking status: Never     Passive exposure: Never    Smokeless tobacco: Never   Vaping Use    Vaping status: Never Used   Substance Use Topics    Alcohol use: Not Currently    Drug use: Never      E-Cigarette/Vaping    E-Cigarette Use Never User       E-Cigarette/Vaping Substances    Nicotine No     THC No     CBD No     Flavoring No     Other No     Unknown No       I have reviewed and agree with the " "history as documented.     Patient is a 39-year-old male brought in by [facility] after trying to get seen at the  office across the street.  States took a hit of acid 2 hours ago and his tongue feels \"weird\" and wanted to get checked out.  Not the first time he's done acid.  Denies any other drug or etoh today.  No Si or hallucinations.          Review of Systems   Constitutional:  Negative for chills and fever.   HENT:  Negative for ear pain and sore throat.         Tongue christopher   Eyes:  Negative for pain and visual disturbance.   Respiratory:  Negative for cough and shortness of breath.    Cardiovascular:  Negative for chest pain and palpitations.   Gastrointestinal:  Negative for abdominal pain and vomiting.   Genitourinary:  Negative for dysuria and hematuria.   Musculoskeletal:  Negative for arthralgias and back pain.   Skin:  Negative for color change and rash.   Neurological:  Negative for seizures and syncope.   All other systems reviewed and are negative.          Objective       ED Triage Vitals [02/17/25 1820]   Temperature Pulse Blood Pressure Respirations SpO2 Patient Position - Orthostatic VS   98.4 °F (36.9 °C) 96 (!) 179/100 18 96 % Sitting      Temp Source Heart Rate Source BP Location FiO2 (%) Pain Score    Oral Monitor Left arm -- --      Vitals      Date and Time Temp Pulse SpO2 Resp BP Pain Score FACES Pain Rating User   02/17/25 1820 98.4 °F (36.9 °C) 96 96 % 18 179/100 -- -- FK            Physical Exam  Vitals and nursing note reviewed.   Constitutional:       Appearance: Normal appearance. He is normal weight.   HENT:      Head: Normocephalic and atraumatic.      Comments: Pt with a significant amount of bruxism      Nose: Nose normal.      Mouth/Throat:      Mouth: Mucous membranes are moist.      Pharynx: Oropharynx is clear.      Comments: No tongue lesion, swelling, oral swelling  Eyes:      Comments: Blown pupil in left eye from previous trauma   Cardiovascular:      Rate and Rhythm: Normal " rate and regular rhythm.      Pulses: Normal pulses.      Heart sounds: Normal heart sounds.   Pulmonary:      Effort: Pulmonary effort is normal.      Breath sounds: Normal breath sounds.   Musculoskeletal:         General: Normal range of motion.      Cervical back: Normal range of motion and neck supple.   Skin:     General: Skin is warm and dry.      Capillary Refill: Capillary refill takes less than 2 seconds.   Neurological:      General: No focal deficit present.      Mental Status: He is alert and oriented to person, place, and time.         Results Reviewed       None            No orders to display       Procedures    ED Medication and Procedure Management   Prior to Admission Medications   Prescriptions Last Dose Informant Patient Reported? Taking?   lisinopril (ZESTRIL) 10 mg tablet   No No   Sig: Take 1 tablet (10 mg total) by mouth daily   metFORMIN (GLUCOPHAGE) 500 mg tablet   Yes No   Sig: Take 500 mg by mouth      Facility-Administered Medications: None     Discharge Medication List as of 2/17/2025  7:25 PM        CONTINUE these medications which have NOT CHANGED    Details   lisinopril (ZESTRIL) 10 mg tablet Take 1 tablet (10 mg total) by mouth daily, Starting Fri 1/10/2025, Normal      metFORMIN (GLUCOPHAGE) 500 mg tablet Take 500 mg by mouth, Historical Med           No discharge procedures on file.  ED SEPSIS DOCUMENTATION   Time reflects when diagnosis was documented in both MDM as applicable and the Disposition within this note       Time User Action Codes Description Comment    2/17/2025  7:25 PM Jasvir Mosquera Add [T50.901A] Accidental overdose, initial encounter                  Jasvir Mosquera MD  02/18/25 5179

## 2025-02-17 NOTE — TELEPHONE ENCOUNTER
Pt came into office and requested for us to call 911 due to pt having racing heartbeat. ROGELIO Farris came and checked his pulse. Pulse was high and   was able to come out and evaluate pt. Pt stated he has a heart murmer and wanted to go to ER and be seen. EMS was called and pt was taken to ER.

## 2025-02-18 NOTE — ED NOTES
Pt resting in bed, no complaints at this time. Even non-labored respirations noted, no signs of distress.      Mila Monae RN  02/17/25 1931

## 2025-06-26 ENCOUNTER — OFFICE VISIT (OUTPATIENT)
Dept: FAMILY MEDICINE CLINIC | Facility: CLINIC | Age: 40
End: 2025-06-26

## 2025-06-26 VITALS
RESPIRATION RATE: 16 BRPM | BODY MASS INDEX: 31.25 KG/M2 | WEIGHT: 211 LBS | SYSTOLIC BLOOD PRESSURE: 130 MMHG | DIASTOLIC BLOOD PRESSURE: 80 MMHG | TEMPERATURE: 98 F | HEIGHT: 69 IN | OXYGEN SATURATION: 99 % | HEART RATE: 94 BPM

## 2025-06-26 DIAGNOSIS — I10 PRIMARY HYPERTENSION: Primary | ICD-10-CM

## 2025-06-26 DIAGNOSIS — E11.9 TYPE 2 DIABETES MELLITUS WITHOUT COMPLICATION, WITHOUT LONG-TERM CURRENT USE OF INSULIN (HCC): ICD-10-CM

## 2025-06-26 PROBLEM — R21 RASH: Status: RESOLVED | Noted: 2025-01-23 | Resolved: 2025-06-26

## 2025-06-26 PROCEDURE — 99214 OFFICE O/P EST MOD 30 MIN: CPT

## 2025-06-26 NOTE — ASSESSMENT & PLAN NOTE
BP noted to be well controlled today in office.    Patient reports He is not taking medications regularly as instructed. He denies medication side effects, but states that he believes taking medications is not healthy. He does not wish to restart his lisinopril.    BP Readings from Last 3 Encounters:   06/26/25 130/80   05/29/25 158/90   02/17/25 (!) 179/100       Current medication regimen: lisinopril (Prinivil) 10 mg daily    PLAN:  Patient does not wish to continue antihypertensive therapy. Will hold off reordering as patient is normotensive.   Counseled on dietary sodium restriction.  Counseled on regular aerobic exercise.  Encouraged to complete outstanding labs.  Follow up in 6 months or sooner as needed.

## 2025-06-26 NOTE — ASSESSMENT & PLAN NOTE
"  Most recent A1C:   Lab Results   Component Value Date    HGBA1C 5.2 01/10/2025       Medication compliance: Patient is not taking the medication and is not interested in restarting or having his A1C checked today.     Current medication regimen:  metformin (Glucophage ) 500 mg daily    PLAN:   Will discontinue metformin as patient does not wish to continue. He is not interested in alternatives as he states medications \"are bad for you\".   Counseled on diabetic diet.  Discussed foot care and encouraged to schedule with ophthalmology.  Encouraged to complete outstanding labs.   Follow up in 6 months or as needed.           "

## 2025-06-26 NOTE — PROGRESS NOTES
"Name: Benja Albarado      : 1985      MRN: [id number]  Encounter Provider: OSCAR Hernandez  Encounter Date: 2025   Encounter department: [facility]  :  Assessment & Plan  Primary hypertension  BP noted to be well controlled today in office.    Patient reports He is not taking medications regularly as instructed. He denies medication side effects, but states that he believes taking medications is not healthy. He does not wish to restart his lisinopril.    BP Readings from Last 3 Encounters:   25 130/80   25 158/90   25 (!) 179/100       Current medication regimen: lisinopril (Prinivil) 10 mg daily    PLAN:  Patient does not wish to continue antihypertensive therapy. Will hold off reordering as patient is normotensive.   Counseled on dietary sodium restriction.  Counseled on regular aerobic exercise.  Encouraged to complete outstanding labs.  Follow up in 6 months or sooner as needed.         Type 2 diabetes mellitus without complication, without long-term current use of insulin ([facility])    Most recent A1C:   Lab Results   Component Value Date    HGBA1C 5.2 01/10/2025       Medication compliance: Patient is not taking the medication and is not interested in restarting or having his A1C checked today.     Current medication regimen:  metformin (Glucophage ) 500 mg daily    PLAN:   Will discontinue metformin as patient does not wish to continue. He is not interested in alternatives as he states medications \"are bad for you\".   Counseled on diabetic diet.  Discussed foot care and encouraged to schedule with ophthalmology.  Encouraged to complete outstanding labs.   Follow up in 6 months or as needed.                  History of Present Illness       Benja Albarado is a 40 year old male with a history of HTN and T2DM. He presents to the office today for follow up. He states he has not been taking the medications because he believes taking medications " "\"is bad for you\". He does not wish to have his A1C checked or have his medications reordered.       Review of Systems   Constitutional:  Negative for chills and fever.   Respiratory:  Negative for cough and shortness of breath.    Cardiovascular:  Negative for chest pain and palpitations.   Skin:  Negative for rash.   Neurological:  Negative for numbness.   All other systems reviewed and are negative.      Objective   /80 (BP Location: Right arm, Patient Position: Sitting, Cuff Size: Standard)   Pulse 94   Temp 98 °F (36.7 °C) (Temporal)   Resp 16   Ht 5' 9\" (1.753 m)   Wt 95.7 kg (211 lb)   SpO2 99%   BMI 31.16 kg/m²      Physical Exam  Vitals and nursing note reviewed.   Constitutional:       General: He is not in acute distress.     Appearance: He is well-developed.   HENT:      Head: Normocephalic and atraumatic.     Eyes:      General: No scleral icterus.     Conjunctiva/sclera: Conjunctivae normal.       Cardiovascular:      Rate and Rhythm: Normal rate and regular rhythm.      Heart sounds: No murmur heard.  Pulmonary:      Effort: Pulmonary effort is normal. No respiratory distress.      Breath sounds: Normal breath sounds.   Abdominal:      Palpations: Abdomen is soft.      Tenderness: There is no abdominal tenderness.     Musculoskeletal:         General: No swelling.      Cervical back: Neck supple.   Lymphadenopathy:      Cervical: No cervical adenopathy.     Skin:     General: Skin is warm and dry.      Capillary Refill: Capillary refill takes less than 2 seconds.     Neurological:      Mental Status: He is alert.     Psychiatric:         Mood and Affect: Mood normal.       "

## 2025-08-21 ENCOUNTER — PATIENT OUTREACH (OUTPATIENT)
Facility: HOSPITAL | Age: 40
End: 2025-08-21

## 2025-08-21 VITALS
SYSTOLIC BLOOD PRESSURE: 130 MMHG | DIASTOLIC BLOOD PRESSURE: 82 MMHG | HEART RATE: 89 BPM | TEMPERATURE: 99.5 F | OXYGEN SATURATION: 99 %

## 2025-08-21 DIAGNOSIS — S05.92XA TRAUMATIC INJURY OF GLOBE OF LEFT EYE: Primary | ICD-10-CM

## 2025-08-21 DIAGNOSIS — I10 PRIMARY HYPERTENSION: ICD-10-CM

## 2025-08-21 DIAGNOSIS — Z11.3 SCREENING FOR STDS (SEXUALLY TRANSMITTED DISEASES): Primary | ICD-10-CM

## 2025-08-21 DIAGNOSIS — E11.9 TYPE 2 DIABETES MELLITUS WITHOUT COMPLICATION, WITHOUT LONG-TERM CURRENT USE OF INSULIN (HCC): ICD-10-CM
